# Patient Record
Sex: FEMALE | HISPANIC OR LATINO | URBAN - METROPOLITAN AREA
[De-identification: names, ages, dates, MRNs, and addresses within clinical notes are randomized per-mention and may not be internally consistent; named-entity substitution may affect disease eponyms.]

---

## 2017-07-24 ENCOUNTER — IMPORTED ENCOUNTER (OUTPATIENT)
Dept: URBAN - METROPOLITAN AREA CLINIC 1 | Facility: CLINIC | Age: 67
End: 2017-07-24

## 2017-07-24 PROBLEM — H04.123: Noted: 2017-07-24

## 2017-07-24 PROBLEM — Z96.1: Noted: 2017-07-24

## 2017-07-24 PROBLEM — H16.143: Noted: 2017-07-24

## 2017-07-24 PROBLEM — H25.811: Noted: 2017-07-24

## 2017-07-24 PROBLEM — H26.492: Noted: 2017-07-24

## 2017-07-24 PROBLEM — H25.813: Noted: 2017-07-24

## 2017-07-24 PROCEDURE — 92015 DETERMINE REFRACTIVE STATE: CPT

## 2017-07-24 PROCEDURE — 92014 COMPRE OPH EXAM EST PT 1/>: CPT

## 2017-07-24 NOTE — PATIENT DISCUSSION
1.  ASAD w/ PEK OU: No improvement post punctal occlusion. Plugs intact LL's OU. Non compliant w/ tears. The increase of artificial tears OU to QID were recommended. Consider Restasisi/ Dylan Breen if no improvement. 2. Cataract OD: Observe w/o intervention. 3.  PCO OS: Observe w/o intervention. 4.  Pseudophakia OS: Doing well. 5. Return for an appointment for a 10/glare in 6 months with Dr. Vandana Saba.

## 2018-01-23 ENCOUNTER — IMPORTED ENCOUNTER (OUTPATIENT)
Dept: URBAN - METROPOLITAN AREA CLINIC 1 | Facility: CLINIC | Age: 68
End: 2018-01-23

## 2018-01-23 PROBLEM — H16.143: Noted: 2018-01-23

## 2018-01-23 PROBLEM — Z96.1: Noted: 2018-01-23

## 2018-01-23 PROBLEM — H26.492: Noted: 2018-01-23

## 2018-01-23 PROBLEM — H25.811: Noted: 2018-01-23

## 2018-01-23 PROBLEM — H04.123: Noted: 2018-01-23

## 2018-01-23 PROCEDURE — 92015 DETERMINE REFRACTIVE STATE: CPT

## 2018-01-23 PROCEDURE — 92012 INTRM OPH EXAM EST PATIENT: CPT

## 2018-01-23 NOTE — PATIENT DISCUSSION
1.  ASAD w/ PEK OU- (H/o Plugs LLs OU) Cont ATs TID OU Routinely. 2.  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 Order CT scan of Brain and orbits with and without contrast today. Pt should have CT Scan scheduled within the next 1-2 weeks recheck patient in 3 weeks for review of testing. 3.  Cataract OD: Observe for now without intervention. The patient was advised to contact us if any change or worsening of vision4. PCO  OS: (Posterior Capsule Opacification)   Observe 5. Pseudophakia OS - (Standard)Return for an appointment in 3 week 10 (review testing) with Dr. Mraina Foley.

## 2018-02-13 ENCOUNTER — IMPORTED ENCOUNTER (OUTPATIENT)
Dept: URBAN - METROPOLITAN AREA CLINIC 1 | Facility: CLINIC | Age: 68
End: 2018-02-13

## 2018-02-13 PROBLEM — H04.123: Noted: 2018-02-13

## 2018-02-13 PROBLEM — H25.811: Noted: 2018-02-13

## 2018-02-13 PROBLEM — Z96.1: Noted: 2018-02-13

## 2018-02-13 PROBLEM — H26.492: Noted: 2018-02-13

## 2018-02-13 PROBLEM — H05.20: Noted: 2018-02-13

## 2018-02-13 PROBLEM — H16.143: Noted: 2018-02-13

## 2018-02-13 PROCEDURE — 92012 INTRM OPH EXAM EST PATIENT: CPT

## 2018-02-13 NOTE — PATIENT DISCUSSION
1.  Proptosis OD with restricted motility- CT scan (2/1/18)- appears consistent w/ Thyroid. Refer to Dr. 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 2. ASAD w/ PEK OU- (H/o Plugs LLs OU) Cont ATs TID OU Routinely. 3.  Cataract OD: Observe for now without intervention. The patient was advised to contact us if any change or worsening of vision4. PCO  OS: Observe 5. Pseudophakia OS - (Standard)6. Return for an appointment for a 30/glare in 4 months with Dr. Honorio Lomeli.

## 2018-09-04 ENCOUNTER — IMPORTED ENCOUNTER (OUTPATIENT)
Dept: URBAN - METROPOLITAN AREA CLINIC 1 | Facility: CLINIC | Age: 68
End: 2018-09-04

## 2018-09-04 PROBLEM — H16.143: Noted: 2018-09-04

## 2018-09-04 PROBLEM — Z96.1: Noted: 2018-09-04

## 2018-09-04 PROBLEM — H25.811: Noted: 2018-09-04

## 2018-09-04 PROBLEM — H26.492: Noted: 2018-09-04

## 2018-09-04 PROBLEM — H04.123: Noted: 2018-09-04

## 2018-09-04 PROCEDURE — 92014 COMPRE OPH EXAM EST PT 1/>: CPT

## 2018-09-04 PROCEDURE — 92015 DETERMINE REFRACTIVE STATE: CPT

## 2018-09-04 NOTE — PATIENT DISCUSSION
Exophthalmos secondary to Thyroid Dz- with restricted motility with superior gaze. Patient followed Dr Alyx Salazar exploring treatment options.

## 2018-09-04 NOTE — PATIENT DISCUSSION
ASAD w/ PEK OU-(plugs intact OU) The increase of artificial tears OU QID Routinely were recommended.

## 2018-09-04 NOTE — PATIENT DISCUSSION
1.  Cataract OD: Patient qualifies but will observe for now without intervention. The patient was advised to contact us if any change or worsening of vision2. Exophthalmos secondary to Thyroid Dz- with restricted motility with superior gaze. Patient followed Dr Destin Davey exploring treatment options. 3. AASD w/ PEK OU-(plugs intact OU) The increase of artificial tears OU QID Routinely were recommended. 4.  PCO  OS: (Posterior Capsule Opacification)   Observe and consider yag cap when pt feels pco visually significant and visual acuity decreases to appropriate level. 5. Pseudophakia OS - standard6. Return for an appointment for 6mo/DFE glare with Dr. Brook Stokes.

## 2019-03-04 ENCOUNTER — IMPORTED ENCOUNTER (OUTPATIENT)
Dept: URBAN - METROPOLITAN AREA CLINIC 1 | Facility: CLINIC | Age: 69
End: 2019-03-04

## 2019-03-04 PROBLEM — H25.811: Noted: 2019-03-04

## 2019-03-04 PROBLEM — H26.492: Noted: 2019-03-04

## 2019-03-04 PROBLEM — H05.89: Noted: 2019-03-04

## 2019-03-04 PROCEDURE — 92012 INTRM OPH EXAM EST PATIENT: CPT

## 2019-03-04 NOTE — PATIENT DISCUSSION
1.  Cataract OD: Patient qualifies but will observe for now without intervention. The patient was advised to contact us if any change or worsening of vision2. Exophthalmos secondary to Thyroid Dz- with restricted motility with superior gaze much improved secondary to ocular plastic sx w/ Dr. Laura Callahan. F/u as scheduled w/ Dr. Laura Callahan. 3. PCO  OS: (Posterior Capsule Opacification)   Observe and consider yag cap when pt feels pco visually significant and visual acuity decreases to appropriate level. 4. ASAD w/ PEK OU-(plugs intact OU) Recommend continue the frequent use of artificial tears OU QID Routinely were recommended. 5.  Pseudophakia OS - standardReturn for an appointment in October 2019 for a [place] / Laury Waller with Dr. Linda Herman.

## 2019-10-07 ENCOUNTER — IMPORTED ENCOUNTER (OUTPATIENT)
Dept: URBAN - METROPOLITAN AREA CLINIC 1 | Facility: CLINIC | Age: 69
End: 2019-10-07

## 2019-10-07 PROBLEM — H25.811: Noted: 2019-10-07

## 2019-10-07 PROBLEM — H26.492: Noted: 2019-10-07

## 2019-10-07 PROCEDURE — 92014 COMPRE OPH EXAM EST PT 1/>: CPT

## 2019-10-07 NOTE — PATIENT DISCUSSION
1.  Cataract OD -- Visually significant however patient wishes to hold. Observe for now without intervention. The patient was advised to contact us if any change or worsening of vision2. PCO  OS -- (Posterior Capsule Opacification) Observe and consider yag cap when pt feels pco visually significant and visual acuity decreases to appropriate level. 3. Exophthalmos secondary to Thyroid Dz- s/p muscle / adhesion sx w/ Dr. Santos Darden. Prior Diplopia improved. 4. ASAD w/ PEK OU -- (plugs intact OU) Recommend continue the frequent use of artificial tears OU QID Routinely were recommended. 5.  Pseudophakia OS -- StandardReturn for an appointment in 6 months for a 10/dfe/glare with Dr. Nataliia Dwo.

## 2022-04-02 ASSESSMENT — KERATOMETRY
OS_K1POWER_DIOPTERS: 43.00
OS_K2POWER_DIOPTERS: 42.50
OS_AXISANGLE_DEGREES: 036
OD_K1POWER_DIOPTERS: 43.00
OS_AXISANGLE2_DEGREES: 126
OD_K2POWER_DIOPTERS: 43.50
OD_AXISANGLE_DEGREES: 176
OD_AXISANGLE2_DEGREES: 086

## 2022-04-02 ASSESSMENT — VISUAL ACUITY
OS_SC: 20/25
OD_SC: 20/30
OD_GLARE: 20/150
OD_CC: J1
OD_SC: 20/25
OS_SC: 20/25
OD_GLARE: 20/200
OS_GLARE: 20/400
OS_GLARE: 20/400
OD_GLARE: 20/400
OD_SC: 20/30
OS_CC: J1
OS_SC: 20/25
OS_SC: 20/25
OS_GLARE: 20/40
OS_GLARE: 20/400
OS_GLARE: 20/40
OS_CC: J1
OD_GLARE: 20/60
OD_GLARE: 20/400
OD_SC: 20/40
OD_GLARE: 20/400
OD_CC: J1
OD_SC: 20/25
OS_SC: 20/20
OS_GLARE: 20/60
OS_SC: 20/25
OD_SC: 20/25

## 2022-04-02 ASSESSMENT — TONOMETRY
OS_IOP_MMHG: 14
OS_IOP_MMHG: 14
OD_IOP_MMHG: 14
OD_IOP_MMHG: 13
OS_IOP_MMHG: 13
OD_IOP_MMHG: 13
OD_IOP_MMHG: 14
OS_IOP_MMHG: 13
OS_IOP_MMHG: 14
OD_IOP_MMHG: 14
OS_IOP_MMHG: 14
OD_IOP_MMHG: 14

## 2024-11-25 ENCOUNTER — NEW PATIENT (OUTPATIENT)
Dept: URBAN - METROPOLITAN AREA CLINIC 1 | Facility: CLINIC | Age: 74
End: 2024-11-25

## 2024-11-25 DIAGNOSIS — H04.123: ICD-10-CM

## 2024-11-25 DIAGNOSIS — H26.492: ICD-10-CM

## 2024-11-25 DIAGNOSIS — H25.811: ICD-10-CM

## 2024-11-25 PROCEDURE — 92015 DETERMINE REFRACTIVE STATE: CPT

## 2024-11-25 PROCEDURE — 92004 COMPRE OPH EXAM NEW PT 1/>: CPT

## 2024-12-03 ENCOUNTER — PRE-OP/H&P (OUTPATIENT)
Age: 74
End: 2024-12-03

## 2024-12-03 VITALS
BODY MASS INDEX: 28.17 KG/M2 | SYSTOLIC BLOOD PRESSURE: 142 MMHG | HEART RATE: 75 BPM | DIASTOLIC BLOOD PRESSURE: 73 MMHG | HEIGHT: 64 IN | WEIGHT: 165 LBS

## 2024-12-03 DIAGNOSIS — H25.811: ICD-10-CM

## 2024-12-03 DIAGNOSIS — H26.492: ICD-10-CM

## 2024-12-03 DIAGNOSIS — Z96.1: ICD-10-CM

## 2024-12-03 PROCEDURE — 92136 OPHTHALMIC BIOMETRY: CPT

## 2024-12-03 PROCEDURE — 99213 OFFICE O/P EST LOW 20 MIN: CPT

## 2024-12-03 PROCEDURE — 66821 AFTER CATARACT LASER SURGERY: CPT | Mod: 25,LT

## 2024-12-03 PROCEDURE — 92025 CPTRIZED CORNEAL TOPOGRAPHY: CPT | Mod: NC

## 2024-12-11 ENCOUNTER — SURGERY/PROCEDURE (OUTPATIENT)
Age: 74
End: 2024-12-11

## 2024-12-11 DIAGNOSIS — H25.811: ICD-10-CM

## 2024-12-11 PROCEDURE — 66984 XCAPSL CTRC RMVL W/O ECP: CPT | Mod: 79,RT

## 2024-12-12 ENCOUNTER — POST-OP (OUTPATIENT)
Age: 74
End: 2024-12-12

## 2024-12-12 DIAGNOSIS — Z96.1: ICD-10-CM

## 2025-01-14 ENCOUNTER — POST-OP (OUTPATIENT)
Age: 75
End: 2025-01-14

## 2025-01-14 DIAGNOSIS — Z98.890: ICD-10-CM

## 2025-01-14 DIAGNOSIS — Z96.1: ICD-10-CM
